# Patient Record
Sex: MALE | Race: WHITE | NOT HISPANIC OR LATINO | Employment: OTHER | URBAN - METROPOLITAN AREA
[De-identification: names, ages, dates, MRNs, and addresses within clinical notes are randomized per-mention and may not be internally consistent; named-entity substitution may affect disease eponyms.]

---

## 2020-05-29 RX ORDER — CYCLOBENZAPRINE HCL 10 MG
10 TABLET ORAL 3 TIMES DAILY PRN
Qty: 30 TABLET | Refills: 0 | Status: SHIPPED | OUTPATIENT
Start: 2020-05-29

## 2020-07-06 ENCOUNTER — TRANSCRIBE ORDERS (OUTPATIENT)
Dept: ADMINISTRATIVE | Facility: HOSPITAL | Age: 25
End: 2020-07-06

## 2020-07-06 ENCOUNTER — HOSPITAL ENCOUNTER (OUTPATIENT)
Dept: GENERAL RADIOLOGY | Facility: HOSPITAL | Age: 25
Discharge: HOME | End: 2020-07-06
Admitting: FAMILY MEDICINE

## 2020-07-06 DIAGNOSIS — M51.06 LUMBAR DISC DISORDER WITH MYELOPATHY: Primary | ICD-10-CM

## 2020-07-06 DIAGNOSIS — M51.06 LUMBAR DISC DISORDER WITH MYELOPATHY: ICD-10-CM

## 2020-07-06 PROCEDURE — 72110 X-RAY EXAM L-2 SPINE 4/>VWS: CPT

## 2020-07-07 ENCOUNTER — TRANSCRIBE ORDERS (OUTPATIENT)
Dept: PHYSICAL THERAPY | Facility: HOSPITAL | Age: 25
End: 2020-07-07

## 2020-07-07 DIAGNOSIS — M51.06 LUMBAR DISC DISORDER WITH MYELOPATHY: Primary | ICD-10-CM

## 2020-07-15 ENCOUNTER — HOSPITAL ENCOUNTER (OUTPATIENT)
Dept: PHYSICAL THERAPY | Facility: HOSPITAL | Age: 25
Setting detail: THERAPIES SERIES
Discharge: HOME | End: 2020-07-15

## 2020-07-15 DIAGNOSIS — M54.50 LOW BACK PAIN, UNSPECIFIED BACK PAIN LATERALITY, UNSPECIFIED CHRONICITY, UNSPECIFIED WHETHER SCIATICA PRESENT: Primary | ICD-10-CM

## 2020-07-15 PROCEDURE — 97161 PT EVAL LOW COMPLEX 20 MIN: CPT | Performed by: PHYSICAL THERAPIST

## 2020-07-15 NOTE — THERAPY EVALUATION
Outpatient Physical Therapy Ortho Initial Evaluation  [facility]     Patient Name: Avery Leonard  : 1995  MRN: [id number]  Today's Date: 7/15/2020      Visit Date: 07/15/2020    There is no problem list on file for this patient.       No past medical history on file.     No past surgical history on file.    Visit Dx:     ICD-10-CM ICD-9-CM   1. Low back pain, unspecified back pain laterality, unspecified chronicity, unspecified whether sciatica present M54.5 724.2         Patient History     Row Name 07/15/20 1300             History    Chief Complaint  Pain  -CR      Type of Pain  Back pain  -CR      Date Current Problem(s) Began  04/15/20  -CR      Brief Description of Current Complaint  25 yo male with recurrent back pain started 3 months ago while studying for [employer].  He reports history of sciatic treatment.  He reports beginning excercising that caused some additional low back pain. Client reports right leg pain with intermittent discomfort.  Right leg pain is worsened with sitting posture.  Laying in bed improves symptoms .   -CR      Previous treatment for THIS PROBLEM  Rehabilitation;Chiropractor  -CR      Patient's Rating of General Health  Very good  -CR      Occupation/sports/leisure activities  Journalism, sitting at desk  -CR      What clinical tests have you had for this problem?  X-ray  -CR      Results of Clinical Tests  No signficant finding  -CR      History of Previous Related Injuries  recurrent low back pain and right leg pain  -CR         Pain     Pain Location  Leg;Back  -CR      Pain at Worst  7  -CR      Pain Frequency  Intermittent;Several days a week  -CR      What Performance Factors Make the Current Problem(s) WORSE?  sitting  -CR      What Performance Factors Make the Current Problem(s) BETTER?  laying on stomach   -CR      Is your sleep disturbed?  Yes  -CR      Difficulties at work?  yes  -CR      Difficulties with ADL's?  yes  -CR         Fall Risk Assessment    Any  falls in the past year:  No  -CR      Does patient have a fear of falling  No  -CR         Daily Activities    Primary Language  English  -CR      Patient is concerned about/has problems with  Difficulty with self care (i.e. bathing, dressing, toileting:;Performing home management (household chores, shopping, care of dependents);Performing sports, recreation, and play activities  -CR      Pt Participated in POC and Goals  Yes  -CR         Safety    Are you being hurt, hit, or frightened by anyone at home or in your life?  No  -CR      Are you being neglected by a caregiver  No  -CR        User Key  (r) = Recorded By, (t) = Taken By, (c) = Cosigned By    Initials Name Provider Type    CR Mikey De La Torre, PT Physical Therapist          PT Ortho     Row Name 07/15/20 1500       Quarter Clearing    Quarter Clearing  Lower Quarter Clearing  -CR       Neural Tension Signs- Lower Quarter Clearing    Slump  Right:;Positive  -CR       Myotomal Screen- Lower Quarter Clearing    Hip flexion (L2)  WNL  -CR    Knee extension (L3)  WNL  -CR    Ankle DF (L4)  WNL  -CR    Great toe extension (L5)  WNL  -CR    Ankle PF (S1)  WNL  -CR    Knee flexion (S2)  WNL  -CR       Lumbar ROM Screen- Lower Quarter Clearing    Lumbar Flexion  Impaired  -CR    Lumbar Extension  Impaired  -CR    Lumbar Lateral Flexion  Normal  -CR       Special Tests/Palpation    Special Tests/Palpation  Lumbar/SI;Hip  -CR       Lumbosacral Accessory Motions    Lumbosacral Accessory Motions Tested?  Yes  -CR    PA Glide- L2  Hypomobile  -CR    PA Glide- L3  Hypomobile  -CR    PA Glide- L4  Hypomobile  -CR    PA Glide- L5  Hypomobile  -CR       Lumbar/SI Special Tests    Slump Test (Neural Tension)  Positive;Right:  -CR    SLR (Neural Tension)  Right:;Positive  -CR       Lumbosacral Palpation    Lumbosacral Palpation?  Yes  -CR    Piriformis  Right:;Tender;Guarded/taut  -CR       General ROM    Head/Neck/Trunk  Trunk Extension;Trunk Flexion;Trunk Lt Lateral  Flexion;Trunk Rt Lateral Flexion  -CR       Head/Neck/Trunk    Trunk Extension AROM  mod limited  -CR    Trunk Flexion AROM  max limited  -CR    Trunk Lt Lateral Flexion AROM  min limited  -CR    Trunk Rt Lateral Flexion AROM  min limited  -CR      User Key  (r) = Recorded By, (t) = Taken By, (c) = Cosigned By    Initials Name Provider Type    Mikey Matamoros PT Physical Therapist                      Therapy Education  Education Details: Client provided written HEP including prone press ups and sciatic n. glides  Given: HEP, Pain management, Symptoms/condition management, Posture/body mechanics  Program: New  How Provided: Verbal, Written, Demonstration  Provided to: Patient  Level of Understanding: Demonstrated, Verbalized     PT OP Goals     Row Name 07/15/20 1500          PT Short Term Goals    STG Date to Achieve  08/12/20  -CR     STG 1  Client will demonstrate independence with HEP  -CR     STG 1 Progress  New  -CR     STG 2  Client will demonstrate full AROM lumbar spine  -CR     STG 2 Progress  New  -CR     STG 3  Client will tolerate sittting 1 hour without symptoms  -CR     STG 3 Progress  New  -CR     STG 4  Client will report OSW limited <5/50  -CR     STG 4 Progress  New  -CR        Time Calculation    PT Goal Re-Cert Due Date  10/13/20  -CR       User Key  (r) = Recorded By, (t) = Taken By, (c) = Cosigned By    Initials Name Provider Type    Mikey Matamoros PT Physical Therapist          PT Assessment/Plan     Row Name 07/15/20 1507          PT Assessment    Functional Limitations  Performance in self-care ADL;Performance in leisure activities;Performance in work activities;Performance in sport activities  -CR     Impairments  Impaired flexibility;Poor body mechanics;Posture;Pain;Range of motion;Joint mobility  -CR     Assessment Comments  23 yo male arrives with evolving symptoms of low complexity.  He demonstrates right sided n. tension, (+) SLUMP testing, right LE radicular  symptoms, and lumbar AROM limitations all affecting self care and ADLs.  Client currently is not demonstrating myotomal weakness, however he will continue to be re assesseed within sessions.   -CR     Please refer to paper survey for additional self-reported information  Yes  -CR     Rehab Potential  Good  -CR     Patient/caregiver participated in establishment of treatment plan and goals  Yes  -CR     Patient would benefit from skilled therapy intervention  Yes  -CR        PT Plan    PT Frequency  1x/week;2x/week  -CR     Predicted Duration of Therapy Intervention (Therapy Eval)  8 visits  -CR     Planned CPT's?  PT EVAL LOW COMPLEXITY: 91934;PT THER PROC EA 15 MIN: 76603;PT MANUAL THERAPY EA 15 MIN: 77707;PT HOT/COLD PACK WC NONMCARE: 72620;PT SELF CARE/MGMT/TRAIN 15 MIN: 76130;PT THER ACT EA 15 MIN: 93838;PT NEUROMUSC RE-EDUCATION EA 15 MIN: 55136;PT SELF CARE/HOME MGMT/TRAIN EA 15: 38455  -CR     Physical Therapy Interventions (Optional Details)  manual therapy techniques;modalities;neuromuscular re-education;patient/family education;postural re-education;ROM (Range of Motion);stretching  -CR     PT Plan Comments  n. gliding, repeated motions, stretching, strengthening, self care and HEP  -CR       User Key  (r) = Recorded By, (t) = Taken By, (c) = Cosigned By    Initials Name Provider Type    CR Mikey De La Torre, PT Physical Therapist                              Outcome Measure Options: Modifed Owestry  Modified Oswestry  Modified Oswestry Score/Comments: 12/50      Time Calculation:     Start Time: 1345     Therapy Charges for Today     Code Description Service Date Service Provider Modifiers Qty    88983876282  PT EVAL LOW COMPLEXITY 3 7/15/2020 Mikey De La Torre, PT GP 1          PT G-Codes  Outcome Measure Options: Modifed Owestry  Modified Oswestry Score/Comments: 12/50         Mikey De La Torre PT  7/15/2020

## 2020-07-24 ENCOUNTER — APPOINTMENT (OUTPATIENT)
Dept: PHYSICAL THERAPY | Facility: HOSPITAL | Age: 25
End: 2020-07-24

## 2020-07-29 ENCOUNTER — HOSPITAL ENCOUNTER (OUTPATIENT)
Dept: PHYSICAL THERAPY | Facility: HOSPITAL | Age: 25
Setting detail: THERAPIES SERIES
Discharge: HOME | End: 2020-07-29

## 2020-07-29 DIAGNOSIS — M54.50 LOW BACK PAIN, UNSPECIFIED BACK PAIN LATERALITY, UNSPECIFIED CHRONICITY, UNSPECIFIED WHETHER SCIATICA PRESENT: Primary | ICD-10-CM

## 2020-07-29 PROCEDURE — 97110 THERAPEUTIC EXERCISES: CPT | Performed by: PHYSICAL THERAPIST

## 2020-07-29 PROCEDURE — 97140 MANUAL THERAPY 1/> REGIONS: CPT | Performed by: PHYSICAL THERAPIST

## 2020-07-29 NOTE — THERAPY TREATMENT NOTE
Outpatient Physical Therapy Ortho Treatment Note  [facility]     Patient Name: Avery Leonard  : 1995  MRN: [id number]  Today's Date: 2020      Visit Date: 2020    Visit Dx:    ICD-10-CM ICD-9-CM   1. Low back pain, unspecified back pain laterality, unspecified chronicity, unspecified whether sciatica present M54.5 724.2       There is no problem list on file for this patient.       No past medical history on file.     No past surgical history on file.                    PT Assessment/Plan     Row Name 20 1034          PT Assessment    Assessment Comments  Clients nerve tension remains signficantly limited, however subjective report of improvements in sitting tolerance and ADLs inidcative that directional preference of extension is providing beneficial results at this time.  He will conitnue to work on this and follow up in 10 days to re assess status. Currently progressing well.    -CR        PT Plan    PT Plan Comments  Cont with programming.    -CR       User Key  (r) = Recorded By, (t) = Taken By, (c) = Cosigned By    Initials Name Provider Type    CR Mikey De La Torre, PT Physical Therapist            OP Exercises     Row Name 20 1000 20 0900          Subjective Comments    Subjective Comments  Client reports seeing improvement in tolerance of sitting and ADLs  -CR  --        Total Minutes    25422 - PT Therapeutic Exercise Minutes  10  -CR  --     81724 - PT Manual Therapy Minutes  --  15  -CR        Exercise 1    Exercise Name 1  Seated N. Glides  -CR  --     Reps 1  20  -CR  --        Exercise 2    Exercise Name 2  Prone press ups  -CR  --     Sets 2  2  -CR  --     Reps 2  10  -CR  --        Exercise 3    Exercise Name 3  Prone press ups with patient OP  -CR  --     Sets 3  2  -CR  --     Reps 3  10  -CR  --        Exercise 4    Exercise Name 4  Prone press ups with sheet  -CR  --     Sets 4  1  -CR  --     Reps 4  10  -CR  --       User Key  (r) = Recorded By,  (t) = Taken By, (c) = Cosigned By    Initials Name Provider Type    Mikey Matamoros PT Physical Therapist                      Manual Rx (last 36 hours)      Manual Treatments     Row Name 07/29/20 0900             Total Minutes    94976 - PT Manual Therapy Minutes  15  -CR         Manual Rx 1    Manual Rx 1 Location  lumbar   -CR      Manual Rx 1 Type  PA mobilization L2-L5  -CR      Manual Rx 1 Grade  II-III  -CR      Manual Rx 1 Duration  8  -CR         Manual Rx 2    Manual Rx 2 Location  right hip   -CR      Manual Rx 2 Type  supine n. gliding  -CR      Manual Rx 2 Duration  7  -CR        User Key  (r) = Recorded By, (t) = Taken By, (c) = Cosigned By    Initials Name Provider Type    Mikey Matamoros PT Physical Therapist                             Time Calculation:   Start Time: 0940  Therapy Charges for Today     Code Description Service Date Service Provider Modifiers Qty    93593056395  PT MANUAL THERAPY EA 15 MIN 7/29/2020 Mikey De La Torre, PT GP 1    [id number]  PT THER PROC EA 15 MIN 7/29/2020 Mikey De La Torre, PT GP 1                    Mikey De La Torre PT  7/29/2020

## 2020-08-07 ENCOUNTER — APPOINTMENT (OUTPATIENT)
Dept: PHYSICAL THERAPY | Facility: HOSPITAL | Age: 25
End: 2020-08-07

## 2020-10-02 ENCOUNTER — DOCUMENTATION (OUTPATIENT)
Dept: PHYSICAL THERAPY | Facility: HOSPITAL | Age: 25
End: 2020-10-02

## 2020-10-02 DIAGNOSIS — M54.50 LOW BACK PAIN, UNSPECIFIED BACK PAIN LATERALITY, UNSPECIFIED CHRONICITY, UNSPECIFIED WHETHER SCIATICA PRESENT: Primary | ICD-10-CM

## 2020-10-02 NOTE — THERAPY DISCHARGE NOTE
Outpatient Physical Therapy Discharge Summary         Patient Name: Avery Leonard  : 1995  MRN: [id number]    Today's Date: 10/2/2020    Visit Dx:    ICD-10-CM ICD-9-CM   1. Low back pain, unspecified back pain laterality, unspecified chronicity, unspecified whether sciatica present  M54.5 724.2           OP PT Discharge Summary  Date of Discharge: 10/02/20  Discharge Instructions/Additional Comments: Client last seen 2020.  Client will be disharged at this time due to inactivity.      Time Calculation:                    Mikey De La Torre, PT  10/2/2020